# Patient Record
Sex: FEMALE | Race: WHITE | Employment: FULL TIME | URBAN - NONMETROPOLITAN AREA
[De-identification: names, ages, dates, MRNs, and addresses within clinical notes are randomized per-mention and may not be internally consistent; named-entity substitution may affect disease eponyms.]

---

## 2020-01-27 ENCOUNTER — OFFICE VISIT (OUTPATIENT)
Dept: ORTHOPEDIC SURGERY | Age: 30
End: 2020-01-27
Payer: COMMERCIAL

## 2020-01-27 VITALS
DIASTOLIC BLOOD PRESSURE: 81 MMHG | BODY MASS INDEX: 34.15 KG/M2 | SYSTOLIC BLOOD PRESSURE: 152 MMHG | WEIGHT: 200 LBS | HEIGHT: 64 IN | HEART RATE: 84 BPM

## 2020-01-27 PROBLEM — G56.03 BILATERAL CARPAL TUNNEL SYNDROME: Status: ACTIVE | Noted: 2020-01-27

## 2020-01-27 PROCEDURE — G8417 CALC BMI ABV UP PARAM F/U: HCPCS | Performed by: ORTHOPAEDIC SURGERY

## 2020-01-27 PROCEDURE — G8484 FLU IMMUNIZE NO ADMIN: HCPCS | Performed by: ORTHOPAEDIC SURGERY

## 2020-01-27 PROCEDURE — 1036F TOBACCO NON-USER: CPT | Performed by: ORTHOPAEDIC SURGERY

## 2020-01-27 PROCEDURE — G8427 DOCREV CUR MEDS BY ELIG CLIN: HCPCS | Performed by: ORTHOPAEDIC SURGERY

## 2020-01-27 PROCEDURE — 99203 OFFICE O/P NEW LOW 30 MIN: CPT | Performed by: ORTHOPAEDIC SURGERY

## 2020-01-27 RX ORDER — OMEPRAZOLE 40 MG/1
40 CAPSULE, DELAYED RELEASE ORAL
COMMUNITY
Start: 2020-01-24

## 2020-01-27 RX ORDER — BUSPIRONE HYDROCHLORIDE 15 MG/1
20 TABLET ORAL
COMMUNITY
Start: 2020-01-03

## 2020-01-27 RX ORDER — CITALOPRAM 20 MG/1
20 TABLET ORAL
COMMUNITY
Start: 2020-01-03

## 2020-01-27 RX ORDER — MONTELUKAST SODIUM 10 MG/1
10 TABLET ORAL
COMMUNITY
Start: 2020-01-24

## 2020-01-27 RX ORDER — AMITRIPTYLINE HYDROCHLORIDE 10 MG/1
10 TABLET, FILM COATED ORAL
COMMUNITY
Start: 2020-01-14

## 2020-01-27 RX ORDER — DICYCLOMINE HCL 20 MG
20 TABLET ORAL
COMMUNITY
Start: 2020-01-14

## 2020-01-27 NOTE — PROGRESS NOTES
CARPAL TUNNEL VISIT        HISTORY OF PRESENT ILLNESS    Thomas Torres is a 34 y.o. female who presents for evaluation bilateral hand issues. She basically has a history consistent with carpal tunnel syndrome over the last 4 years that is failed to respond to conservative measures. She had an EMG nerve conduction velocity recently which demonstrated severe bilateral carpal tunnel syndrome. She is here for disposition. ROS    Well-documented the patient history form dated 1/27/2020  All other ROS negative except for above. Past Surgical history    Past Surgical History:   Procedure Laterality Date    CHOLECYSTECTOMY         PAST MEDICAL    No past medical history on file. Allergies    Allergies   Allergen Reactions    Macrobid [Nitrofurantoin Macrocrystal]     Amoxicillin Rash    Pcn [Penicillins] Rash       Meds    Current Outpatient Medications   Medication Sig Dispense Refill    amitriptyline (ELAVIL) 10 MG tablet 10 mg      busPIRone (BUSPAR) 15 MG tablet 15 mg      citalopram (CELEXA) 20 MG tablet 20 mg      dicyclomine (BENTYL) 20 MG tablet 20 mg      montelukast (SINGULAIR) 10 MG tablet 10 mg      omeprazole (PRILOSEC) 40 MG delayed release capsule 40 mg       No current facility-administered medications for this visit.         Social    Social History     Socioeconomic History    Marital status: Not on file     Spouse name: Not on file    Number of children: Not on file    Years of education: Not on file    Highest education level: Not on file   Occupational History    Not on file   Social Needs    Financial resource strain: Not on file    Food insecurity:     Worry: Not on file     Inability: Not on file    Transportation needs:     Medical: Not on file     Non-medical: Not on file   Tobacco Use    Smoking status: Former Smoker    Smokeless tobacco: Never Used   Substance and Sexual Activity    Alcohol use: Not on file    Drug use: Not on file    Sexual activity: Not on file   Lifestyle    Physical activity:     Days per week: Not on file     Minutes per session: Not on file    Stress: Not on file   Relationships    Social connections:     Talks on phone: Not on file     Gets together: Not on file     Attends Sabianism service: Not on file     Active member of club or organization: Not on file     Attends meetings of clubs or organizations: Not on file     Relationship status: Not on file    Intimate partner violence:     Fear of current or ex partner: Not on file     Emotionally abused: Not on file     Physically abused: Not on file     Forced sexual activity: Not on file   Other Topics Concern    Not on file   Social History Narrative    Not on file       Family HISTORY    No family history on file. PHYSICAL EXAM    Vital Signs:  BP (!) 152/81   Pulse 84   Ht 5' 4\" (1.626 m)   Wt 200 lb (90.7 kg)   BMI 34.33 kg/m²   General Appearance:  Normal body habitus. Alert and oriented to person, place, and time. Affect:  Normal.   Gait:  Normal. Good balance and coordination. Reflexes:  Intact. Pulses:  Normal.   Skin:  Normal.     Wrist Exam  Hand dominance -right  Surface Exam - no visible lesions are seen      Hand Exam:      Neurologic Exam:    Reflexes:  Normal  Phalens:  Positive  Tinels:  Positive  Median Nerve Compression:  Positive  Thenar strength: Diminished bilateral  Thenar atrophy: None noted  2 PD: Diminished in the median nerve distribution of both hands  Elbow Flexion Test:  Negative    Wrist Motion Right Left   DF     PF     RD     CMC     UD     SUP     PRO       NCV / EMG STUDIES    Severe bilateral carpal tunnel syndrome right worse than left    IMAGING STUDIES    X-rays were not done today. IMPRESSION    Bilateral severe carpal tunnel syndrome    PLAN    1. Conservative care options including physical therapy, NSAIDs, bracing, and activity modification were discussed. 2.  The indications for therapeutic injections were discussed.    3.  The indications for additional imaging studies were discussed. 4.  After considering the various options discussed, the patient elected to pursue a course that includes proceeding with right followed 2 weeks later minimum with a left carpal tunnel release. INFORMED CONSENT NOTE        We discussed the risks, benefits, and alternatives to the proposed procedure, as well as the necessity of other members of the healthcare team participating in the procedure. All questions were answered and the patient elected to proceed with the proposed procedure and signed the informed consent form.

## 2020-01-27 NOTE — LETTER
IMAGING STUDIES    X-rays were not done today. IMPRESSION    Bilateral severe carpal tunnel syndrome    PLAN    1. Conservative care options including physical therapy, NSAIDs, bracing, and activity modification were discussed. 2.  The indications for therapeutic injections were discussed. 3.  The indications for additional imaging studies were discussed. 4.  After considering the various options discussed, the patient elected to pursue a course that includes proceeding with right followed 2 weeks later minimum with a left carpal tunnel release. INFORMED CONSENT NOTE        We discussed the risks, benefits, and alternatives to the proposed procedure, as well as the necessity of other members of the healthcare team participating in the procedure. All questions were answered and the patient elected to proceed with the proposed procedure and signed the informed consent form. Stanley Perez.  MD Hi Garcia MD

## 2020-02-07 LAB
HCG URINE: NEGATIVE
INTERNAL QC: NORMAL

## 2020-02-10 ENCOUNTER — TELEPHONE (OUTPATIENT)
Dept: ORTHOPEDIC SURGERY | Age: 30
End: 2020-02-10

## 2020-02-21 ENCOUNTER — OFFICE VISIT (OUTPATIENT)
Dept: ORTHOPEDIC SURGERY | Age: 30
End: 2020-02-21

## 2020-02-21 VITALS — BODY MASS INDEX: 35 KG/M2 | HEIGHT: 64 IN | WEIGHT: 205 LBS

## 2020-02-21 PROCEDURE — 99024 POSTOP FOLLOW-UP VISIT: CPT | Performed by: ORTHOPAEDIC SURGERY

## 2020-02-21 NOTE — PROGRESS NOTES
FOLLOW-UP VISIT      The patient returns for follow-up s/p right carpal tunnel release    Date of Surgery    2/7/2020    Wound Status    Sutures Removed, Incisions are healing well with no surrounding erythema, and minimal ecchymosis. Exam    She is doing well says that her night pain is gone and her numbness and tingling is gone and she is quite happy with the result today. Plan    Suture removal and bracing    Follow-up Appointment    2 weeks for surgery on her opposite hand in 4 weeks for follow-up on that surgical hand.

## 2020-03-06 LAB
HCG URINE: NEGATIVE
INTERNAL QC: NORMAL

## 2020-03-09 ENCOUNTER — TELEPHONE (OUTPATIENT)
Dept: ORTHOPEDIC SURGERY | Age: 30
End: 2020-03-09

## 2020-03-18 ENCOUNTER — OFFICE VISIT (OUTPATIENT)
Dept: ORTHOPEDIC SURGERY | Age: 30
End: 2020-03-18
Payer: COMMERCIAL

## 2020-03-18 PROCEDURE — 99024 POSTOP FOLLOW-UP VISIT: CPT | Performed by: ORTHOPAEDIC SURGERY

## 2020-03-18 PROCEDURE — L3908 WHO COCK-UP NONMOLDE PRE OTS: HCPCS | Performed by: ORTHOPAEDIC SURGERY

## 2020-03-18 NOTE — PROGRESS NOTES
FOLLOW-UP VISIT      The patient returns for follow-up s/p left carpal tunnel release    Date of Surgery    3/6/2020    Wound Status    Sutures Removed, Incisions are healing well with no surrounding erythema, and minimal ecchymosis. Exam    She is doing well says that her night pain is gone and her numbness and tingling is gone and she is quite happy with the result today. Plan    Suture removal and bracing    Follow-up Appointment    4 weeks PRN. Procedures    Gel Wrist Tegan Daniels Brace     Patient was prescribed a Tegan Daniels Gel Wrist Brace. The left wrist will require stabilization / immobilization from this semi-rigid / rigid orthosis to improve their function. The orthosis will assist in protecting the affected area, provide functional support and facilitate healing. The patient was educated and fit by a healthcare professional with expert knowledge and specialization in brace application while under the direct supervision of the treating physician. Verbal and written instructions for the use of and application of this item were provided. They were instructed to contact the office immediately should the brace result in increased pain, decreased sensation, increased swelling or worsening of the condition.

## 2020-04-09 ENCOUNTER — TELEPHONE (OUTPATIENT)
Dept: ORTHOPEDIC SURGERY | Age: 30
End: 2020-04-09